# Patient Record
Sex: FEMALE | Race: WHITE
[De-identification: names, ages, dates, MRNs, and addresses within clinical notes are randomized per-mention and may not be internally consistent; named-entity substitution may affect disease eponyms.]

---

## 2023-05-13 ENCOUNTER — HOSPITAL ENCOUNTER (EMERGENCY)
Dept: HOSPITAL 4 - SED | Age: 53
Discharge: HOME | End: 2023-05-13
Payer: COMMERCIAL

## 2023-05-13 VITALS — WEIGHT: 150 LBS | SYSTOLIC BLOOD PRESSURE: 118 MMHG | BODY MASS INDEX: 26.58 KG/M2 | HEIGHT: 63 IN

## 2023-05-13 VITALS — SYSTOLIC BLOOD PRESSURE: 118 MMHG

## 2023-05-13 DIAGNOSIS — Z79.899: ICD-10-CM

## 2023-05-13 DIAGNOSIS — S51.811A: Primary | ICD-10-CM

## 2023-05-13 DIAGNOSIS — Z88.8: ICD-10-CM
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
## 2023-05-13 NOTE — NUR
Pt bib  from home. Pt with right forearm laceration. controlled bleed 
with applied pressure. Pt pain gradient 5/10, FROM, cap return less than 3 sec. 
Pt states was cleaning a glass vase when trauma occurred. Pt is [id number].

## 2023-05-13 NOTE — NUR
Patient given written and verbal discharge instructions and verbalizes 
understanding.  ER MD discussed with patient the results and treatment 
provided. Patient in stable condition. ID arm band removed.

Opportunity for questions provided and answered. Medication side effect fact 
sheet provided.